# Patient Record
Sex: FEMALE | Race: WHITE | Employment: FULL TIME | URBAN - METROPOLITAN AREA
[De-identification: names, ages, dates, MRNs, and addresses within clinical notes are randomized per-mention and may not be internally consistent; named-entity substitution may affect disease eponyms.]

---

## 2019-02-18 ENCOUNTER — OFFICE VISIT (OUTPATIENT)
Dept: SURGERY | Age: 44
End: 2019-02-18

## 2019-02-18 VITALS
RESPIRATION RATE: 20 BRPM | TEMPERATURE: 98.6 F | WEIGHT: 129 LBS | DIASTOLIC BLOOD PRESSURE: 89 MMHG | BODY MASS INDEX: 29.02 KG/M2 | HEIGHT: 56 IN | HEART RATE: 93 BPM | SYSTOLIC BLOOD PRESSURE: 133 MMHG

## 2019-02-18 DIAGNOSIS — Z98.84 H/O LAPAROSCOPIC ADJUSTABLE GASTRIC BANDING: Primary | ICD-10-CM

## 2019-02-18 DIAGNOSIS — R10.11 RUQ PAIN: ICD-10-CM

## 2019-02-18 RX ORDER — CEPHALEXIN 500 MG/1
CAPSULE ORAL
Refills: 0 | COMMUNITY
Start: 2019-01-24 | End: 2019-02-18

## 2019-02-18 RX ORDER — MELOXICAM 15 MG/1
15 TABLET ORAL
COMMUNITY
End: 2019-02-18

## 2019-02-18 RX ORDER — DROSPIRENONE AND ETHINYL ESTRADIOL 0.03MG-3MG
KIT ORAL
Refills: 0 | COMMUNITY
Start: 2018-12-26

## 2019-02-18 RX ORDER — DEXTROAMPHETAMINE SACCHARATE, AMPHETAMINE ASPARTATE MONOHYDRATE, DEXTROAMPHETAMINE SULFATE AND AMPHETAMINE SULFATE 5; 5; 5; 5 MG/1; MG/1; MG/1; MG/1
CAPSULE, EXTENDED RELEASE ORAL
Refills: 0 | COMMUNITY
Start: 2019-01-16 | End: 2019-02-18

## 2019-02-18 RX ORDER — ALBUTEROL SULFATE 90 UG/1
AEROSOL, METERED RESPIRATORY (INHALATION)
COMMUNITY
Start: 2018-02-02

## 2019-02-18 RX ORDER — BUPROPION HYDROCHLORIDE 150 MG/1
TABLET ORAL
COMMUNITY
Start: 2018-08-10

## 2019-02-18 RX ORDER — RANITIDINE 150 MG/1
150 TABLET, FILM COATED ORAL
COMMUNITY
Start: 2018-08-10

## 2019-02-18 RX ORDER — CETIRIZINE HCL 10 MG
10 TABLET ORAL
COMMUNITY
End: 2019-02-18

## 2019-02-18 RX ORDER — DEXTROAMPHETAMINE SACCHARATE, AMPHETAMINE ASPARTATE MONOHYDRATE, DEXTROAMPHETAMINE SULFATE AND AMPHETAMINE SULFATE 5; 5; 5; 5 MG/1; MG/1; MG/1; MG/1
20 CAPSULE, EXTENDED RELEASE ORAL
COMMUNITY
Start: 2019-02-11

## 2019-02-18 RX ORDER — AZELASTINE HYDROCHLORIDE, FLUTICASONE PROPIONATE 137; 50 UG/1; UG/1
SPRAY, METERED NASAL
COMMUNITY

## 2019-02-18 NOTE — PROGRESS NOTES
Bariatric follow-up Evaluation      Subjective:     Janice Kamara is a 44 y.o. female with a history of lap banding in  by Dr. Vincenzo Rider. She weighed 178 pre-op and has kept her weight off ever since. She had her last adjustment in  and apparently has 7.5 ml in the band. She had RUQ pain last month and had a negative RUQ US for that. She also lost about 10 lbs in the last 4-6 months with the result of new LUQ pain near her port site. The pain is like a sharp stabbing pain, lateral to the port site. She now cannot sleep in her left side due to this soreness. There are no active problems to display for this patient. Past Medical History:   Diagnosis Date    ADD (attention deficit disorder)     Asthma     Depression     GERD (gastroesophageal reflux disease)     Hx of tonsillectomy 03/10/2016    Hyperlipidemia     Multiple food allergies     Vitamin D deficiency       Past Surgical History:   Procedure Laterality Date    HX BREAST REDUCTION      HX  SECTION  2010    HX GI  2008    Lap Band     HX HERNIA REPAIR      Umbilical     HX SEPTOPLASTY  07/10/2013      Social History     Tobacco Use    Smoking status: Never Smoker    Smokeless tobacco: Never Used   Substance Use Topics    Alcohol use: No     Frequency: Never      Family History   Problem Relation Age of Onset    Heart Disease Mother     Dementia Mother     COPD Father     Cancer Father         Thyroid CA      Current Outpatient Medications   Medication Sig    albuterol (PROAIR HFA) 90 mcg/actuation inhaler USE 2 PUFFS EVERY 4 HOURS AS NEEDED FOR WHEEZING    azelastine-fluticasone 137-50 mcg/spray spry Spray  in Nose.     buPROPion XL (WELLBUTRIN XL) 150 mg tablet take 1 tablet by mouth once daily    amphetamine-dextroamphetamine XR (ADDERALL XR) 20 mg XR capsule 20 mg.    drospirenone-ethinyl estradiol (DOLORES) 3-0.03 mg tab     raNITIdine (ZANTAC) 150 mg tablet 150 mg.    Cetirizine 10 mg cap Take  by mouth.    montelukast (SINGULAIR) 10 mg tablet Take 1 Tab by mouth daily.  amphetamine-dextroamphetamine XR (ADDERALL XR) 20 mg XR capsule take 1 capsule by mouth twice a day     No current facility-administered medications for this visit.        Allergies   Allergen Reactions    Shellfish Derived Anaphylaxis    Adhesive Contact Dermatitis    Egg Other (comments)     GI Distress    Nuts [Tree Nut] Swelling     Swelling mouth    Peanut Swelling     Swelling mouth    Sulfa (Sulfonamide Antibiotics) Hives        Review of Systems:  Positive in BOLD    CONST: Fever, weight loss, fatigue or chills  GI: Nausea, vomiting, abdominal pain, change in bowel habits, hematochezia, melena, and GERD   INTEG: Dermatitis, abnormal moles  HEENT: Recent changes in vision, vertigo, epistaxis, dysphagia and hoarseness  CV: Chest pain, palpitations, HTN, edema and varicosities  RESP: Cough, shortness of breath, wheezing, hemoptysis, snoring and reactive airway disease  : Hematuria, dysuria, frequency, urgency, nocturia and stress urinary incontinence   MS: Weakness, joint pain and arthritis  ENDO: Diabetes, thyroid disease, polyuria, polydipsia, polyphagia, poor wound healing, heat intolerance, cold intolerance  LYMPH/HEME: Anemia, bruising and history of blood transfusions  NEURO: Dizziness, headache, fainting, seizures and stroke  PSYCH: Anxiety and depression, ADD    Objective:     Visit Vitals  /89 (BP 1 Location: Left arm, BP Patient Position: At rest)   Pulse 93   Temp 98.6 °F (37 °C) (Oral)   Resp 20   Ht 4' 8\" (1.422 m)   Wt 58.5 kg (129 lb)   BMI 28.92 kg/m²       Physical Exam:      GENERAL: alert, cooperative, no distress, appears stated age  EYE:conjunctivae and sclerae normal, pupils equal, round, reactive to light, extraocular movements intact without nystagmus  THROAT & NECK: no erythema or exudates noted and neck supple and symmetrical; no palpable masses  LUNG: clear to auscultation bilaterally  HEART: Regular rate and rhythm  ABDOMEN: soft, nontender except at her left epigastric/LUQ port site. No erythema. The catheter transits close to the costal margin  EXTREMITIES:  extremities normal, atraumatic, no cyanosis or edema  SKIN: Normal.    Imaging and Lab Review:   Findings of ultrasound of the abdomen: normal.       No results found for this or any previous visit (from the past 24 hour(s)). images and reports reviewed    Assessment:   1. Lap Banding -   2. LUQ pain  3. Volitional weight loss    Plan:     1. I suspect this pain is due to irritation at the port catheter transit site in the abdominal wall  2. Will obtain an UGI first and possibly look with an EGD based on the UGI to confirm this is not an early presentation of an erosion with consequent port site infection. 3. If there is no erosion. The most effective option is to relocate the port which she does not want to entertain. Anti-inflammatory short course may also help. 4. If there is an erosion, treat as indicated.      Signed By: Pablo Gleason MD     February 18, 2019

## 2019-02-18 NOTE — PROGRESS NOTES
Alvarez Cox is a 44 y.o. female who presents today with   Chief Complaint   Patient presents with    Morbid Obesity     Pt presents today for evaluation of RUQ abd pain. Pt had Lap Band placed in 2008 by Dr. Jett Flynn. Last adjustment was 2015 and says fluid should be around 7.5.                1. Have you been to the ER, urgent care clinic since your last visit? Hospitalized since your last visit? No    2. Have you seen or consulted any other health care providers outside of the [address] [place] [address] since your last visit? Include any pap smears or colon screening.  No

## 2019-02-18 NOTE — PATIENT INSTRUCTIONS
If you have any questions or concerns about today's appointment, the verbal and/or written instructions you were given for follow up care, please call our office at [phone].     [facility]  [address] [place] [address] [place] [address]    [phone] office  [phone]

## 2019-02-18 NOTE — LETTER
2019 7:50 PM 
 
Patient:  Francisco Javier Rose YOB: 1975 Date of Visit: 2019 Fanny Herndon, [address] [facility] [address] [address] [place] [address] VIA Facsimile: [phone] Dear Fanny Herndon MD, Thank you for referring Ms. Francisco Javier Rose to [place] [address] for evaluation and treatment. Below are the relevant portions of my assessment and plan of care. Bariatric follow-up Evaluation Subjective:  
 
Mireille Casas is a 44 y.o. female with a history of lap banding in  by Dr. Aris Barragan. She weighed 178 pre-op and has kept her weight off ever since. She had her last adjustment in  and apparently has 7.5 ml in the band. She had RUQ pain last month and had a negative RUQ US for that. She also lost about 10 lbs in the last 4-6 months with the result of new LUQ pain near her port site. The pain is like a sharp stabbing pain, lateral to the port site. She now cannot sleep in her left side due to this soreness. There are no active problems to display for this patient. Past Medical History:  
Diagnosis Date  ADD (attention deficit disorder)  Asthma  Depression  GERD (gastroesophageal reflux disease)  Hx of tonsillectomy 03/10/2016  Hyperlipidemia  Multiple food allergies  Vitamin D deficiency Past Surgical History:  
Procedure Laterality Date  HX BREAST REDUCTION    
 HX  SECTION  2010  HX GI  2008 Lap Band  HX HERNIA REPAIR  2013 Umbilical   
 HX SEPTOPLASTY  07/10/2013 Social History Tobacco Use  Smoking status: Never Smoker  Smokeless tobacco: Never Used Substance Use Topics  Alcohol use: No  
  Frequency: Never Family History Problem Relation Age of Onset  Heart Disease Mother  Dementia Mother  COPD Father  Cancer Father Thyroid CA Current Outpatient Medications Medication Sig  
  albuterol (PROAIR HFA) 90 mcg/actuation inhaler USE 2 PUFFS EVERY 4 HOURS AS NEEDED FOR WHEEZING  
 azelastine-fluticasone 137-50 mcg/spray spry Spray  in Nose.  buPROPion XL (WELLBUTRIN XL) 150 mg tablet take 1 tablet by mouth once daily  amphetamine-dextroamphetamine XR (ADDERALL XR) 20 mg XR capsule 20 mg.  
 drospirenone-ethinyl estradiol (DOLORES) 3-0.03 mg tab  raNITIdine (ZANTAC) 150 mg tablet 150 mg.  
 Cetirizine 10 mg cap Take  by mouth.  montelukast (SINGULAIR) 10 mg tablet Take 1 Tab by mouth daily.  amphetamine-dextroamphetamine XR (ADDERALL XR) 20 mg XR capsule take 1 capsule by mouth twice a day No current facility-administered medications for this visit. Allergies Allergen Reactions  Shellfish Derived Anaphylaxis  Adhesive Contact Dermatitis  Egg Other (comments) GI Distress  Nuts [Tree Nut] Swelling Swelling mouth  Peanut Swelling Swelling mouth  Sulfa (Sulfonamide Antibiotics) Hives Review of Systems:  Positive in BOLD 
 
CONST: Fever, weight loss, fatigue or chills GI: Nausea, vomiting, abdominal pain, change in bowel habits, hematochezia, melena, and GERD INTEG: Dermatitis, abnormal moles HEENT: Recent changes in vision, vertigo, epistaxis, dysphagia and hoarseness CV: Chest pain, palpitations, HTN, edema and varicosities RESP: Cough, shortness of breath, wheezing, hemoptysis, snoring and reactive airway disease : Hematuria, dysuria, frequency, urgency, nocturia and stress urinary incontinence MS: Weakness, joint pain and arthritis ENDO: Diabetes, thyroid disease, polyuria, polydipsia, polyphagia, poor wound healing, heat intolerance, cold intolerance LYMPH/HEME: Anemia, bruising and history of blood transfusions NEURO: Dizziness, headache, fainting, seizures and stroke PSYCH: Anxiety and depression, ADD Objective:  
 
Visit Vitals /89 (BP 1 Location: Left arm, BP Patient Position: At rest) Pulse 93 Temp 98.6 °F (37 °C) (Oral) Resp 20 Ht 4' 8\" (1.422 m) Wt 58.5 kg (129 lb) BMI 28.92 kg/m² Physical Exam:   
 
GENERAL: alert, cooperative, no distress, appears stated age EYE:conjunctivae and sclerae normal, pupils equal, round, reactive to light, extraocular movements intact without nystagmus THROAT & NECK: no erythema or exudates noted and neck supple and symmetrical; no palpable masses LUNG: clear to auscultation bilaterally HEART: Regular rate and rhythm ABDOMEN: soft, nontender except at her left epigastric/LUQ port site. No erythema. The catheter transits close to the costal margin EXTREMITIES:  extremities normal, atraumatic, no cyanosis or edema SKIN: Normal. 
 
Imaging and Lab Review:  
Findings of ultrasound of the abdomen: normal.  
 
 
No results found for this or any previous visit (from the past 24 hour(s)). images and reports reviewed Assessment:  
1. Lap Banding - 2. LUQ pain 3. Volitional weight loss Plan: 1. I suspect this pain is due to irritation at the port catheter transit site in the abdominal wall 2. Will obtain an UGI first and possibly look with an EGD based on the UGI to confirm this is not an early presentation of an erosion with consequent port site infection. 3. If there is no erosion. The most effective option is to relocate the port which she does not want to entertain. Anti-inflammatory short course may also help. 4. If there is an erosion, treat as indicated. Signed By: Brittaney Doyle MD   
 February 18, 2019 Thank you very much for your referral of Ms. Sorin Easley. If you have questions, please do not hesitate to call me. I look forward to following Ms. Kartik Page along with you and will keep you updated as to her progress.   
 
 
 
 
Sincerely, 
 
 
Brittaney Doyle MD

## 2019-02-26 NOTE — COMMUNICATION BODY
Bariatric follow-up Evaluation      Subjective:     Helio Estrada is a 44 y.o. female with a history of lap banding in  by Dr. Cory Severin. She weighed 178 pre-op and has kept her weight off ever since. She had her last adjustment in  and apparently has 7.5 ml in the band. She had RUQ pain last month and had a negative RUQ US for that. She also lost about 10 lbs in the last 4-6 months with the result of new LUQ pain near her port site. The pain is like a sharp stabbing pain, lateral to the port site. She now cannot sleep in her left side due to this soreness. There are no active problems to display for this patient. Past Medical History:   Diagnosis Date    ADD (attention deficit disorder)     Asthma     Depression     GERD (gastroesophageal reflux disease)     Hx of tonsillectomy 03/10/2016    Hyperlipidemia     Multiple food allergies     Vitamin D deficiency       Past Surgical History:   Procedure Laterality Date    HX BREAST REDUCTION      HX  SECTION  2010    HX GI  2008    Lap Band     HX HERNIA REPAIR      Umbilical     HX SEPTOPLASTY  07/10/2013      Social History     Tobacco Use    Smoking status: Never Smoker    Smokeless tobacco: Never Used   Substance Use Topics    Alcohol use: No     Frequency: Never      Family History   Problem Relation Age of Onset    Heart Disease Mother     Dementia Mother     COPD Father     Cancer Father         Thyroid CA      Current Outpatient Medications   Medication Sig    albuterol (PROAIR HFA) 90 mcg/actuation inhaler USE 2 PUFFS EVERY 4 HOURS AS NEEDED FOR WHEEZING    azelastine-fluticasone 137-50 mcg/spray spry Spray  in Nose.     buPROPion XL (WELLBUTRIN XL) 150 mg tablet take 1 tablet by mouth once daily    amphetamine-dextroamphetamine XR (ADDERALL XR) 20 mg XR capsule 20 mg.    drospirenone-ethinyl estradiol (DOLORES) 3-0.03 mg tab     raNITIdine (ZANTAC) 150 mg tablet 150 mg.    Cetirizine 10 mg cap Take  by mouth.    montelukast (SINGULAIR) 10 mg tablet Take 1 Tab by mouth daily.  amphetamine-dextroamphetamine XR (ADDERALL XR) 20 mg XR capsule take 1 capsule by mouth twice a day     No current facility-administered medications for this visit.        Allergies   Allergen Reactions    Shellfish Derived Anaphylaxis    Adhesive Contact Dermatitis    Egg Other (comments)     GI Distress    Nuts [Tree Nut] Swelling     Swelling mouth    Peanut Swelling     Swelling mouth    Sulfa (Sulfonamide Antibiotics) Hives        Review of Systems:  Positive in BOLD    CONST: Fever, weight loss, fatigue or chills  GI: Nausea, vomiting, abdominal pain, change in bowel habits, hematochezia, melena, and GERD   INTEG: Dermatitis, abnormal moles  HEENT: Recent changes in vision, vertigo, epistaxis, dysphagia and hoarseness  CV: Chest pain, palpitations, HTN, edema and varicosities  RESP: Cough, shortness of breath, wheezing, hemoptysis, snoring and reactive airway disease  : Hematuria, dysuria, frequency, urgency, nocturia and stress urinary incontinence   MS: Weakness, joint pain and arthritis  ENDO: Diabetes, thyroid disease, polyuria, polydipsia, polyphagia, poor wound healing, heat intolerance, cold intolerance  LYMPH/HEME: Anemia, bruising and history of blood transfusions  NEURO: Dizziness, headache, fainting, seizures and stroke  PSYCH: Anxiety and depression, ADD    Objective:     Visit Vitals  /89 (BP 1 Location: Left arm, BP Patient Position: At rest)   Pulse 93   Temp 98.6 °F (37 °C) (Oral)   Resp 20   Ht 4' 8\" (1.422 m)   Wt 58.5 kg (129 lb)   BMI 28.92 kg/m²       Physical Exam:      GENERAL: alert, cooperative, no distress, appears stated age  EYE:conjunctivae and sclerae normal, pupils equal, round, reactive to light, extraocular movements intact without nystagmus  THROAT & NECK: no erythema or exudates noted and neck supple and symmetrical; no palpable masses  LUNG: clear to auscultation bilaterally  HEART: Regular rate and rhythm  ABDOMEN: soft, nontender except at her left epigastric/LUQ port site. No erythema. The catheter transits close to the costal margin  EXTREMITIES:  extremities normal, atraumatic, no cyanosis or edema  SKIN: Normal.    Imaging and Lab Review:   Findings of ultrasound of the abdomen: normal.       No results found for this or any previous visit (from the past 24 hour(s)). images and reports reviewed    Assessment:   1. Lap Banding -   2. LUQ pain  3. Volitional weight loss    Plan:     1. I suspect this pain is due to irritation at the port catheter transit site in the abdominal wall  2. Will obtain an UGI first and possibly look with an EGD based on the UGI to confirm this is not an early presentation of an erosion with consequent port site infection. 3. If there is no erosion. The most effective option is to relocate the port which she does not want to entertain. Anti-inflammatory short course may also help. 4. If there is an erosion, treat as indicated.      Signed By: Sherrill Amaya MD     February 18, 2019

## 2019-03-07 ENCOUNTER — HOSPITAL ENCOUNTER (OUTPATIENT)
Dept: GENERAL RADIOLOGY | Age: 44
Discharge: HOME | End: 2019-03-07
Attending: SURGERY
Payer: COMMERCIAL

## 2019-03-07 DIAGNOSIS — Z98.84 H/O LAPAROSCOPIC ADJUSTABLE GASTRIC BANDING: ICD-10-CM

## 2019-03-07 DIAGNOSIS — R10.11 RUQ PAIN: ICD-10-CM

## 2019-03-07 PROCEDURE — 74241 XR UPPER GI SERIES W KUB: CPT

## 2019-03-07 PROCEDURE — 74011000255 HC RX REV CODE- 255: Performed by: SURGERY

## 2019-03-07 RX ADMIN — BARIUM SULFATE 176 G: 960 POWDER, FOR SUSPENSION ORAL at 11:00
